# Patient Record
Sex: MALE | URBAN - METROPOLITAN AREA
[De-identification: names, ages, dates, MRNs, and addresses within clinical notes are randomized per-mention and may not be internally consistent; named-entity substitution may affect disease eponyms.]

---

## 2023-09-06 ENCOUNTER — TELEPHONE (OUTPATIENT)
Dept: NEUROLOGY | Facility: CLINIC | Age: 43
End: 2023-09-06
Payer: MEDICARE

## 2023-09-06 NOTE — TELEPHONE ENCOUNTER
Staff spoke to pt, provider does not see pt who have autonomic dysreflexia.   ----- Message from Gabino Castle MD sent at 9/6/2023 12:16 PM CDT -----  Regarding: Incorrectly scheduled  This patient is scheduled to see me for autonomic dysreflexia post spinal cord injury. Please let them know that I don't treat autonomic disorders.      Thanks  Gabino